# Patient Record
Sex: MALE | Race: WHITE | NOT HISPANIC OR LATINO | URBAN - METROPOLITAN AREA
[De-identification: names, ages, dates, MRNs, and addresses within clinical notes are randomized per-mention and may not be internally consistent; named-entity substitution may affect disease eponyms.]

---

## 2018-08-09 ENCOUNTER — ANESTHESIA EVENT (OUTPATIENT)
Dept: OPERATING ROOM | Facility: HOSPITAL | Age: 53
Setting detail: HOSPITAL OUTPATIENT SURGERY
End: 2018-08-09
Payer: COMMERCIAL

## 2018-08-16 ENCOUNTER — HOSPITAL ENCOUNTER (OUTPATIENT)
Facility: HOSPITAL | Age: 53
Setting detail: HOSPITAL OUTPATIENT SURGERY
Discharge: HOME | End: 2018-08-16
Attending: OTOLARYNGOLOGY | Admitting: OTOLARYNGOLOGY
Payer: COMMERCIAL

## 2018-08-16 ENCOUNTER — ANESTHESIA (OUTPATIENT)
Dept: OPERATING ROOM | Facility: HOSPITAL | Age: 53
Setting detail: HOSPITAL OUTPATIENT SURGERY
End: 2018-08-16
Payer: COMMERCIAL

## 2018-08-16 VITALS
BODY MASS INDEX: 26.95 KG/M2 | TEMPERATURE: 96.7 F | HEART RATE: 65 BPM | OXYGEN SATURATION: 94 % | WEIGHT: 210 LBS | HEIGHT: 74 IN | DIASTOLIC BLOOD PRESSURE: 85 MMHG | SYSTOLIC BLOOD PRESSURE: 130 MMHG | RESPIRATION RATE: 16 BRPM

## 2018-08-16 DIAGNOSIS — Z01.818 PREOP TESTING: Primary | ICD-10-CM

## 2018-08-16 DIAGNOSIS — Q18.0 BRANCHIAL CLEFT CYST: ICD-10-CM

## 2018-08-16 PROBLEM — R22.1 MASS OF RIGHT SIDE OF NECK: Chronic | Status: ACTIVE | Noted: 2018-08-16

## 2018-08-16 PROCEDURE — 63600000 HC DRUGS/DETAIL CODE: Performed by: ANESTHESIOLOGY

## 2018-08-16 PROCEDURE — 63700000 HC SELF-ADMINISTRABLE DRUG: Performed by: OTOLARYNGOLOGY

## 2018-08-16 PROCEDURE — 27200000 HC STERILE SUPPLY: Performed by: OTOLARYNGOLOGY

## 2018-08-16 PROCEDURE — 25800000 HC PHARMACY IV SOLUTIONS: Performed by: OTOLARYNGOLOGY

## 2018-08-16 PROCEDURE — 63600000 HC DRUGS/DETAIL CODE: Performed by: OTOLARYNGOLOGY

## 2018-08-16 PROCEDURE — 36000003 HC OR LEVEL 3 INITIAL 30MIN: Performed by: OTOLARYNGOLOGY

## 2018-08-16 PROCEDURE — 71000011 HC PACU PHASE 1 EA ADDL MIN: Performed by: OTOLARYNGOLOGY

## 2018-08-16 PROCEDURE — 36000013 HC OR LEVEL 3 EA ADDL MIN: Performed by: OTOLARYNGOLOGY

## 2018-08-16 PROCEDURE — 88307 TISSUE EXAM BY PATHOLOGIST: CPT | Performed by: OTOLARYNGOLOGY

## 2018-08-16 PROCEDURE — 71000002 HC PACU PHASE 2 INITIAL 30MIN: Performed by: OTOLARYNGOLOGY

## 2018-08-16 PROCEDURE — 25000000 HC PHARMACY GENERAL: Performed by: NURSE ANESTHETIST, CERTIFIED REGISTERED

## 2018-08-16 PROCEDURE — 63600000 HC DRUGS/DETAIL CODE: Performed by: NURSE ANESTHETIST, CERTIFIED REGISTERED

## 2018-08-16 PROCEDURE — 71000001 HC PACU PHASE 1 INITIAL 30MIN: Performed by: OTOLARYNGOLOGY

## 2018-08-16 PROCEDURE — 0WB60ZZ EXCISION OF NECK, OPEN APPROACH: ICD-10-PCS | Performed by: OTOLARYNGOLOGY

## 2018-08-16 PROCEDURE — 37000001 HC ANESTHESIA GENERAL: Performed by: OTOLARYNGOLOGY

## 2018-08-16 PROCEDURE — 25800000 HC PHARMACY IV SOLUTIONS: Performed by: NURSE ANESTHETIST, CERTIFIED REGISTERED

## 2018-08-16 PROCEDURE — 25000000 HC PHARMACY GENERAL: Performed by: OTOLARYNGOLOGY

## 2018-08-16 PROCEDURE — 71000012 HC PACU PHASE 2 EA ADDL MIN: Performed by: OTOLARYNGOLOGY

## 2018-08-16 RX ORDER — FENTANYL CITRATE 50 UG/ML
50 INJECTION, SOLUTION INTRAMUSCULAR; INTRAVENOUS
Status: DISCONTINUED | OUTPATIENT
Start: 2018-08-16 | End: 2018-08-16 | Stop reason: HOSPADM

## 2018-08-16 RX ORDER — CEFAZOLIN SODIUM/WATER 2 G/20 ML
2 SYRINGE (ML) INTRAVENOUS ONCE
Status: COMPLETED | OUTPATIENT
Start: 2018-08-16 | End: 2018-08-16

## 2018-08-16 RX ORDER — ACETAMINOPHEN 325 MG/1
650 TABLET ORAL EVERY 4 HOURS PRN
Status: DISCONTINUED | OUTPATIENT
Start: 2018-08-16 | End: 2018-08-16 | Stop reason: HOSPADM

## 2018-08-16 RX ORDER — ONDANSETRON HYDROCHLORIDE 2 MG/ML
INJECTION, SOLUTION INTRAVENOUS AS NEEDED
Status: DISCONTINUED | OUTPATIENT
Start: 2018-08-16 | End: 2018-08-16 | Stop reason: SURG

## 2018-08-16 RX ORDER — MORPHINE SULFATE 4 MG/ML
1-2 INJECTION, SOLUTION INTRAMUSCULAR; INTRAVENOUS
Status: DISCONTINUED | OUTPATIENT
Start: 2018-08-16 | End: 2018-08-16 | Stop reason: HOSPADM

## 2018-08-16 RX ORDER — PROPOFOL 10 MG/ML
INJECTION, EMULSION INTRAVENOUS AS NEEDED
Status: DISCONTINUED | OUTPATIENT
Start: 2018-08-16 | End: 2018-08-16 | Stop reason: SURG

## 2018-08-16 RX ORDER — BACITRACIN 500 UNIT/G
OINTMENT (GRAM) TOPICAL AS NEEDED
Status: DISCONTINUED | OUTPATIENT
Start: 2018-08-16 | End: 2018-08-16 | Stop reason: HOSPADM

## 2018-08-16 RX ORDER — DEXTROSE 40 %
15-30 GEL (GRAM) ORAL AS NEEDED
Status: DISCONTINUED | OUTPATIENT
Start: 2018-08-16 | End: 2018-08-16 | Stop reason: HOSPADM

## 2018-08-16 RX ORDER — LIDOCAINE HCL/EPINEPHRINE/PF 2%-1:200K
VIAL (ML) INJECTION AS NEEDED
Status: DISCONTINUED | OUTPATIENT
Start: 2018-08-16 | End: 2018-08-16 | Stop reason: HOSPADM

## 2018-08-16 RX ORDER — ONDANSETRON HYDROCHLORIDE 2 MG/ML
4 INJECTION, SOLUTION INTRAVENOUS
Status: DISCONTINUED | OUTPATIENT
Start: 2018-08-16 | End: 2018-08-16 | Stop reason: HOSPADM

## 2018-08-16 RX ORDER — DEXTROSE 50 % IN WATER (D50W) INTRAVENOUS SYRINGE
25 AS NEEDED
Status: DISCONTINUED | OUTPATIENT
Start: 2018-08-16 | End: 2018-08-16 | Stop reason: HOSPADM

## 2018-08-16 RX ORDER — SODIUM CHLORIDE 9 MG/ML
20 INJECTION, SOLUTION INTRAVENOUS CONTINUOUS
Status: DISCONTINUED | OUTPATIENT
Start: 2018-08-16 | End: 2018-08-16 | Stop reason: HOSPADM

## 2018-08-16 RX ORDER — DEXAMETHASONE SODIUM PHOSPHATE 4 MG/ML
INJECTION, SOLUTION INTRA-ARTICULAR; INTRALESIONAL; INTRAMUSCULAR; INTRAVENOUS; SOFT TISSUE AS NEEDED
Status: DISCONTINUED | OUTPATIENT
Start: 2018-08-16 | End: 2018-08-16 | Stop reason: SURG

## 2018-08-16 RX ORDER — GLYCOPYRROLATE 0.6MG/3ML
SYRINGE (ML) INTRAVENOUS AS NEEDED
Status: DISCONTINUED | OUTPATIENT
Start: 2018-08-16 | End: 2018-08-16 | Stop reason: SURG

## 2018-08-16 RX ORDER — ROCURONIUM BROMIDE 10 MG/ML
INJECTION, SOLUTION INTRAVENOUS AS NEEDED
Status: DISCONTINUED | OUTPATIENT
Start: 2018-08-16 | End: 2018-08-16 | Stop reason: SURG

## 2018-08-16 RX ORDER — HYDRALAZINE HYDROCHLORIDE 20 MG/ML
INJECTION INTRAMUSCULAR; INTRAVENOUS AS NEEDED
Status: DISCONTINUED | OUTPATIENT
Start: 2018-08-16 | End: 2018-08-16 | Stop reason: SURG

## 2018-08-16 RX ORDER — MIDAZOLAM HYDROCHLORIDE 2 MG/2ML
INJECTION, SOLUTION INTRAMUSCULAR; INTRAVENOUS AS NEEDED
Status: DISCONTINUED | OUTPATIENT
Start: 2018-08-16 | End: 2018-08-16 | Stop reason: SURG

## 2018-08-16 RX ORDER — HYDROMORPHONE HYDROCHLORIDE 1 MG/ML
0.5 INJECTION, SOLUTION INTRAMUSCULAR; INTRAVENOUS; SUBCUTANEOUS
Status: DISCONTINUED | OUTPATIENT
Start: 2018-08-16 | End: 2018-08-16 | Stop reason: HOSPADM

## 2018-08-16 RX ORDER — NEOSTIGMINE METHYLSULFATE 1 MG/ML
INJECTION INTRAVENOUS AS NEEDED
Status: DISCONTINUED | OUTPATIENT
Start: 2018-08-16 | End: 2018-08-16 | Stop reason: SURG

## 2018-08-16 RX ORDER — IBUPROFEN 200 MG
16-32 TABLET ORAL AS NEEDED
Status: DISCONTINUED | OUTPATIENT
Start: 2018-08-16 | End: 2018-08-16 | Stop reason: HOSPADM

## 2018-08-16 RX ORDER — FENTANYL CITRATE 50 UG/ML
INJECTION, SOLUTION INTRAMUSCULAR; INTRAVENOUS AS NEEDED
Status: DISCONTINUED | OUTPATIENT
Start: 2018-08-16 | End: 2018-08-16 | Stop reason: SURG

## 2018-08-16 RX ORDER — SODIUM CHLORIDE 9 MG/ML
INJECTION, SOLUTION INTRAVENOUS CONTINUOUS PRN
Status: DISCONTINUED | OUTPATIENT
Start: 2018-08-16 | End: 2018-08-16 | Stop reason: SURG

## 2018-08-16 RX ORDER — LIDOCAINE HYDROCHLORIDE 10 MG/ML
INJECTION, SOLUTION INFILTRATION; PERINEURAL AS NEEDED
Status: DISCONTINUED | OUTPATIENT
Start: 2018-08-16 | End: 2018-08-16 | Stop reason: SURG

## 2018-08-16 RX ADMIN — ROCURONIUM BROMIDE 30 MG: 10 INJECTION INTRAVENOUS at 08:20

## 2018-08-16 RX ADMIN — PROPOFOL 50 MG: 10 INJECTION, EMULSION INTRAVENOUS at 09:30

## 2018-08-16 RX ADMIN — HYDRALAZINE HYDROCHLORIDE 5 MG: 20 INJECTION INTRAMUSCULAR; INTRAVENOUS at 09:23

## 2018-08-16 RX ADMIN — DEXAMETHASONE SODIUM PHOSPHATE 10 MG: 4 INJECTION, SOLUTION INTRAMUSCULAR; INTRAVENOUS at 08:18

## 2018-08-16 RX ADMIN — FENTANYL CITRATE 25 MCG: 50 INJECTION INTRAMUSCULAR; INTRAVENOUS at 10:55

## 2018-08-16 RX ADMIN — LIDOCAINE HYDROCHLORIDE 5 ML: 10 INJECTION, SOLUTION INFILTRATION; PERINEURAL at 08:20

## 2018-08-16 RX ADMIN — Medication 2 G: at 08:12

## 2018-08-16 RX ADMIN — ONDANSETRON 4 MG: 2 INJECTION INTRAMUSCULAR; INTRAVENOUS at 10:15

## 2018-08-16 RX ADMIN — FENTANYL CITRATE 50 MCG: 50 INJECTION INTRAMUSCULAR; INTRAVENOUS at 09:01

## 2018-08-16 RX ADMIN — HYDROMORPHONE HYDROCHLORIDE 0.5 MG: 1 INJECTION, SOLUTION INTRAMUSCULAR; INTRAVENOUS; SUBCUTANEOUS at 12:15

## 2018-08-16 RX ADMIN — Medication 4 MG: at 10:34

## 2018-08-16 RX ADMIN — Medication 0.4 MG: at 10:34

## 2018-08-16 RX ADMIN — SODIUM CHLORIDE 20 ML/HR: 9 INJECTION, SOLUTION INTRAVENOUS at 07:09

## 2018-08-16 RX ADMIN — SODIUM CHLORIDE: 9 INJECTION, SOLUTION INTRAVENOUS at 08:09

## 2018-08-16 RX ADMIN — FENTANYL CITRATE 25 MCG: 50 INJECTION INTRAMUSCULAR; INTRAVENOUS at 09:30

## 2018-08-16 RX ADMIN — PROPOFOL 200 MG: 10 INJECTION, EMULSION INTRAVENOUS at 08:20

## 2018-08-16 RX ADMIN — MIDAZOLAM HYDROCHLORIDE 2 MG: 1 INJECTION, SOLUTION INTRAMUSCULAR; INTRAVENOUS at 08:18

## 2018-08-16 RX ADMIN — FENTANYL CITRATE 50 MCG: 50 INJECTION INTRAMUSCULAR; INTRAVENOUS at 10:50

## 2018-08-16 RX ADMIN — FENTANYL CITRATE 100 MCG: 50 INJECTION INTRAMUSCULAR; INTRAVENOUS at 08:18

## 2018-08-16 RX ADMIN — FENTANYL CITRATE 50 MCG: 50 INJECTION INTRAMUSCULAR; INTRAVENOUS at 09:17

## 2018-08-16 ASSESSMENT — PAIN - FUNCTIONAL ASSESSMENT
PAIN_FUNCTIONAL_ASSESSMENT: 0-10

## 2018-08-16 ASSESSMENT — PAIN DESCRIPTION - DESCRIPTORS: DESCRIPTORS: ACHING

## 2018-08-16 NOTE — ANESTHESIA POSTPROCEDURE EVALUATION
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:  (Branchial Cleft Cyst)    Surgeon:  Geoffrey Pan MD Responsible Provider:  Warren Bartlett DO    Anesthesia Type:  general ASA Status:  1          Anesthesia Type: general  PACU Vitals  8/16/2018 1056 - 8/16/2018 1115      8/16/2018 1100             BP: (!)  146/91    Temp: 36.9 °C (98.5 °F)    Pulse: 64    Resp: 15    SpO2: 96 %            Anesthesia Post Evaluation    Pain management: adequate  Patient location during evaluation: PACU  Patient participation: complete - patient participated  Level of consciousness: awake and alert  Cardiovascular status: acceptable  Airway Patency: adequate  Respiratory status: acceptable  Hydration status: acceptable  Anesthetic complications: no

## 2018-08-16 NOTE — ANESTHESIA PREPROCEDURE EVALUATION
Anesthesia ROS/MED HX      Neuro/Psych - neg  Cardiovascular- neg   ECG reviewed   Normal ECG  GI/Hepatic- neg  Renal Disease- neg  Endo/Other- neg      Past Surgical History:   Procedure Laterality Date   • WISDOM TOOTH EXTRACTION       Wt Readings from Last 3 Encounters:   08/16/18 95.3 kg (210 lb)   08/08/18 95.3 kg (210 lb)       Temp Readings from Last 3 Encounters:   08/16/18 36.7 °C (98.1 °F) (Temporal)       BP Readings from Last 3 Encounters:   08/16/18 140/88       Pulse Readings from Last 3 Encounters:   08/16/18 (!) 57       No results found for: WBC, HGB, HCT, MCV, PLT    No results found for: GLUCOSE, CALCIUM, NA, K, CO2, CL, BUN, CREATININE    No results found for: HCGPREGUR, PREGSERUM, HCG, HCGQUANT          Current Facility-Administered Medications   Medication Dose Route Frequency Provider Last Rate Last Dose   • ceFAZolin in sterile water (ANCEF) injection 2 g  2 g intravenous Once Geoffrey Pan MD       • sodium chloride 0.9 % infusion  20 mL/hr intravenous Continuous Geoffrey Pan MD 20 mL/hr at 08/16/18 0709 20 mL/hr at 08/16/18 0709       Prior to Admission medications    Not on File       There is no problem list on file for this patient.      Past Medical History:   Diagnosis Date   • Precancerous skin lesion     removed       Past Surgical History:   Procedure Laterality Date   • WISDOM TOOTH EXTRACTION             Physical Exam    Airway   Mallampati: II   TM distance: >3 FB   Neck ROM: full  Cardiovascular - normal   Rhythm: regular   Rate: normal  Pulmonary - normal   clear to auscultation  Dental - normal        Anesthesia Plan    Plan: general     Airway: direct visual laryngoscopy and oral intubation   ASA 1  Anesthetic plan and risks discussed with: patient  Induction:    intravenous

## 2018-08-16 NOTE — PERIOPERATIVE NURSING NOTE
Patient and wife, Albina, state they have received and are comfortable with discharge instructions from Dr. Pan. Patient states they were instructed to remove the dressing tomorrow and follow up with Dr. Pan in one week. Patient has voided, is tolerating PO intake, and states that his discomfort is tolerable at this time.

## 2018-08-16 NOTE — ANESTHESIA PROCEDURE NOTES
Airway  Urgency: elective    Start Time: 8/16/2018 8:21 AM    General Information and Staff    Patient location during procedure: OR  Anesthesiologist: LUCY DE LEON  Resident/CRNA: IRIS MEDINA    Indications and Patient Condition  Indications for airway management: anesthesia  Sedation level: general  Preoxygenated: yes  Patient position: sniffing  MILS maintained throughout  Mask difficulty assessment: 1 - vent by mask    Final Airway Details  Final airway type: endotracheal airway      Successful airway: ETT    Successful intubation technique: direct laryngoscopy  Blade: Lake  Blade size: #3  ETT size: 7.5 mm  Cormack-Lehane Classification: grade I - full view of glottis  Placement verified by: chest auscultation and capnometry   Measured from: lips  ETT to lips (cm): 22  Number of attempts at approach: 1  Atraumatic airway insertion

## 2018-08-16 NOTE — OP NOTE
"Preop Dx: Right Nerck mass     Postop Dx: Same as preop    Procedure:Excision right neck mass    Surgeon: Dr. Geoffrey Pan MD    Assistant:    Anesthesia: GETA    EBL: 200 cc    Specimen: None     Complications: none     Drains: 1/4 \" penrose    Operative Summary: This is an operative dictation on  Sly Martinez .  This 53-year-old male has a long-standing history of a large right cervical mass, since 2013.  It had become infected during the year of 2018 and was treated with antibiotic management.  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  Patient is being admitted for elective excision under general anesthesia.    Patient was brought to the operating room under general anesthesia via oral endotracheal tube intubation which was atraumatic.  Shoulder roll was placed and the neck was hyperextended.  Patient was prepped and draped in usual format.  The incision was marked on the right right neck extending from the angle of the mandible down along the anterior border of the sternocleidomastoid muscle measuring approximately 7 cm.  The mandible margin was marked as well.  Incision was then made several minutes after patient was infiltrated with 1% Xylocaine with 1/100,000 epinephrine.  This was carried through the skin, subcutaneous tissue, platysma, and investing fascia.  Subfascial dissection revealed a large mass that was adherent to the sternocleidomastoid muscle and extended just posterior to the submandibular gland.  The mass itself measured approximately 4-5 cm.  Blunt dissection was carried out to release the posterior edge of the tumor from the sternocleidomastoid muscle.  Needlepoint cautery was used to control oozing and several small vessels were divided and ligated with 2-0 and 3-0 silk ties.  The " dissection anteriorly revealed a vessel that was divided and ligated using 2-0 silk ties was felt to be the lingual vein.  There is another branch but there was also identified and divided and ligated.  Dissection was carried out inferiorly along the sternocleidomastoid muscle down to the level of hyoid bone.  The internal jugular vein was visualized deep to the dissection.  The inferior aspect of the dissection was then released.  The ansa hypoglossal was identified dissection was then carried out from inferior to superior.  Several vessels were divided and ligated superiorly.  The digastric tendon was identified as was the hypoglossal nerve.  These were kept intact away from the dissection.  Careful dissection did not reveal any functioning duct as will be the case ,in a brachial cleft cyst, but due to his previous infections it is felt that it could have been atrophic.  The mass was then removed and the needlepoint cautery used to control oozing.  Wound was irrigated.  Total blood loss of approximately 200 cc was noted.  Procedure was tolerated nicely.  There were no complications during the procedure.    The wound was then closed using 4 chromic sutures at the level of platysma and also at the subcutaneous level.  A 1/4 inch Penrose drain was placed and was not secured to the wound.  Running locked  5-0 nylon stitch was placed in the skin level and then dressings were applied.  The drain itself was actually sewn to the dressings so that it will be removed with the dressing change.  A universal fascial band was applied externally for pressure.  Patient was extubated in good order with good airway anbd good voice.  This been operative dictation on Sly Martinez, dictated by Maxim on 8/16/2018. Send copy of this note to be sent to Dr. Serrano.  Geoffrey Pan MD

## 2024-02-01 LAB
CASE RPRT: NORMAL
CLINICAL INFO: NORMAL
PATH REPORT ADDENDUM.SYNOPTIC DOC: NORMAL
PATH REPORT.ADDENDUM SPEC: NORMAL
PATH REPORT.ADDENDUM SPEC: NORMAL
PATH REPORT.FINAL DX SPEC: NORMAL
PATH REPORT.FINAL DX SPEC: NORMAL
PATH REPORT.GROSS SPEC: NORMAL

## 2024-04-24 ENCOUNTER — EVALUATION (OUTPATIENT)
Dept: PHYSICAL THERAPY | Facility: CLINIC | Age: 59
End: 2024-04-24
Payer: COMMERCIAL

## 2024-04-24 DIAGNOSIS — I89.0 LYMPHEDEMA: Primary | ICD-10-CM

## 2024-04-24 PROCEDURE — 97162 PT EVAL MOD COMPLEX 30 MIN: CPT | Performed by: PHYSICAL THERAPIST

## 2024-04-24 PROCEDURE — 97140 MANUAL THERAPY 1/> REGIONS: CPT | Performed by: PHYSICAL THERAPIST

## 2024-04-24 RX ORDER — LANOLIN ALCOHOL/MO/W.PET/CERES
3 CREAM (GRAM) TOPICAL
COMMUNITY

## 2024-04-24 NOTE — LETTER
2024    ESPERANZA Flores  [address] [id number]    Patient: Burak Smith   YOB: 1965   Date of Visit: 2024     Encounter Diagnosis     ICD-10-CM    1. Lymphedema  I89.0           Dear Dr. Kingsley:    Thank you for your recent referral of Burak Smith. Please review the attached evaluation summary from Burak's recent visit.     Please verify that you agree with the plan of care by signing the attached order.     If you have any questions or concerns, please do not hesitate to call.     I sincerely appreciate the opportunity to share in the care of one of your patients and hope to have another opportunity to work with you in the near future.       Sincerely,    Kandy Bedoya, PT      Referring Provider:      I certify that I have read the below Plan of Care and certify the need for these services furnished under this plan of treatment while under my care.                    ESPERANZA Flores  [address] [address]  Via Fax: [phone]          PT Evaluation     Today's date: 2024  Patient name: Burak Smith  : 1965  MRN: [id number]  Referring provider: Dahiana Kingsley CRNP  Dx:   Encounter Diagnosis     ICD-10-CM    1. Lymphedema  I89.0                      Assessment  Assessment details: Pt is a 58 y.o. year old male coming to outpatient PT with facial lymphedema onset 10/23. Pt presents with increased edema decreased cervical ROM, good UE ROM /strength, and overall decreased functional mobility. Pt would benefit from skilled PT services in order to address these deficits and reach maximum level of function. Thank you kindly for the referral!    Pt has a high insurance co pay of $80. Pt requested that therapist instruct his friend on how to perform facial MLD. Pt was instructed to purchase a facial compression garment. K tape to scar was perform and instruction  "given. Pt and friend were instructed in lymphedema management and MLD technique.  Impairments: abnormal or restricted ROM, activity intolerance and lacks appropriate home exercise program  Other impairment: increased facial edema  Barriers to therapy: High co pay  Understanding of Dx/Px/POC: good   Prognosis: good    Goals  Consul only. No goals set.    Plan  Patient would benefit from: PT eval and skilled physical therapy  Planned therapy interventions: home exercise program  Other planned therapy interventions: MLD/ CLT  Frequency: follow up -prn.  Duration in weeks: 4  Plan of Care beginning date: 4/24/2024  Plan of Care expiration date: 5/22/2024  Treatment plan discussed with: PTA and patient        Subjective Evaluation    History of Present Illness  Mechanism of injury: Pt has a history of recurrent tonsillar squamous cell carcinoma diagnosed in 9/2023. Pt reports increased chin swelling since TORS resection 10/12/2023. 40 lymph nodes were removed, with 4 positive for squamous cell carcinoma.  Pt underwent chemotherapy and radiation therapy tx January 1/2024. Feeding tube and chemo port have been removed. Pt was later diagnosed with lymphedema. Pt feels increased tighntess  under his chin and has decreased ROM in his neck and has difficulty looking down and turning his head to drive. Pt is making improvements with swallowing. Pt has constant dry/ \"cotton mouth\"     Work: sales  Hobbies: blue grass shows  Gait: no abnormalities  Patient Goals  Patient goals for therapy: decreased edema    Pain  No pain reported  Location: neck; R ear- burning  Quality: burning and tight    Social Support    Employment status: working  Hand dominance: right          Objective     Neurological Testing     Sensation     Shoulder   Left Shoulder   Intact: light touch    Right Shoulder   Intact: Light touch    Reflexes   Left   Biceps (C5/C6): trace (1+)  Brachioradialis (C6): trace (1+)  Triceps (C7): trace (1+)    Right   Biceps " (C5/C6): trace (1+)  Brachioradialis (C6): trace (1+)  Triceps (C7): trace (1+)    Active Range of Motion   Cervical/Thoracic Spine       Cervical    Flexion: 35 degrees   Extension: 40 degrees      Left lateral flexion: 24 degrees     with pain  Right lateral flexion: 23 degrees     with pain  Left rotation: 55 degrees  Right rotation: 55 degrees     Left Shoulder   Normal active range of motion    Right Shoulder   Normal active range of motion    Additional Active Range of Motion Details  Posture: pt sits with mild rounded shoulders/ forward head  Mild scar tissue over R anterior neck incision  (+) R cheek facial edema and submandibular edema;   (-) fibrosis  (-) stemmer sign    Strength/Myotome Testing     Left Shoulder   Normal muscle strength    Right Shoulder   Normal muscle strength         Dx: facial lymphedema  EPOC:   CO-MORBIDITIES:   PERSONAL FACTORS:   Precautions: none      Manuals 4/24            MLD/ CLT             Instruction in self/ friend MLD 8'                                      Neuro Re-Ed                                                                                                                     Ther Ex             Lymphedema dx & mgt educ 5'                                                                                                                                 Ther Activity                                       Gait Training                                       Modalities

## 2024-04-24 NOTE — PROGRESS NOTES
PT Evaluation     Today's date: 2024  Patient name: Burak Smith  : 1965  MRN: [id number]  Referring provider: Dahiana Kingsley CRNP  Dx:   Encounter Diagnosis     ICD-10-CM    1. Lymphedema  I89.0                      Assessment  Assessment details: Pt is a 58 y.o. year old male coming to outpatient PT with facial lymphedema onset 10/23. Pt presents with increased edema decreased cervical ROM, good UE ROM /strength, and overall decreased functional mobility. Pt would benefit from skilled PT services in order to address these deficits and reach maximum level of function. Thank you kindly for the referral!    Pt has a high insurance co pay of $80. Pt requested that therapist instruct his friend on how to perform facial MLD. Pt was instructed to purchase a facial compression garment. K tape to scar was perform and instruction given. Pt and friend were instructed in lymphedema management and MLD technique.  Impairments: abnormal or restricted ROM, activity intolerance and lacks appropriate home exercise program  Other impairment: increased facial edema  Barriers to therapy: High co pay  Understanding of Dx/Px/POC: good   Prognosis: good    Goals  Consul only. No goals set.    Plan  Patient would benefit from: PT eval and skilled physical therapy  Planned therapy interventions: home exercise program  Other planned therapy interventions: MLD/ CLT  Frequency: follow up -prn.  Duration in weeks: 4  Plan of Care beginning date: 2024  Plan of Care expiration date: 2024  Treatment plan discussed with: PTA and patient        Subjective Evaluation    History of Present Illness  Mechanism of injury: Pt has a history of recurrent tonsillar squamous cell carcinoma diagnosed in 2023. Pt reports increased chin swelling since TORS resection 10/12/2023. 40 lymph nodes were removed, with 4 positive for squamous cell carcinoma.  Pt underwent chemotherapy and radiation therapy tx 2024. Feeding tube  "and chemo port have been removed. Pt was later diagnosed with lymphedema. Pt feels increased tighntess  under his chin and has decreased ROM in his neck and has difficulty looking down and turning his head to drive. Pt is making improvements with swallowing. Pt has constant dry/ \"cotton mouth\"     Work: sales  Hobbies: blue grass shows  Gait: no abnormalities  Patient Goals  Patient goals for therapy: decreased edema    Pain  No pain reported  Location: neck; R ear- burning  Quality: burning and tight    Social Support    Employment status: working  Hand dominance: right          Objective     Neurological Testing     Sensation     Shoulder   Left Shoulder   Intact: light touch    Right Shoulder   Intact: Light touch    Reflexes   Left   Biceps (C5/C6): trace (1+)  Brachioradialis (C6): trace (1+)  Triceps (C7): trace (1+)    Right   Biceps (C5/C6): trace (1+)  Brachioradialis (C6): trace (1+)  Triceps (C7): trace (1+)    Active Range of Motion   Cervical/Thoracic Spine       Cervical    Flexion: 35 degrees   Extension: 40 degrees      Left lateral flexion: 24 degrees     with pain  Right lateral flexion: 23 degrees     with pain  Left rotation: 55 degrees  Right rotation: 55 degrees     Left Shoulder   Normal active range of motion    Right Shoulder   Normal active range of motion    Additional Active Range of Motion Details  Posture: pt sits with mild rounded shoulders/ forward head  Mild scar tissue over R anterior neck incision  (+) R cheek facial edema and submandibular edema;   (-) fibrosis  (-) stemmer sign    Strength/Myotome Testing     Left Shoulder   Normal muscle strength    Right Shoulder   Normal muscle strength         Dx: facial lymphedema  EPOC:   CO-MORBIDITIES:   PERSONAL FACTORS:   Precautions: none      Manuals 4/24            MLD/ CLT             Instruction in self/ friend MLD 8'                                      Neuro Re-Ed                                                                    "                                                  Ther Ex             Lymphedema dx & mgt educ 5'                                                                                                                                 Ther Activity                                       Gait Training                                       Modalities

## (undated) DEVICE — RETRACTOR WEITLANER

## (undated) DEVICE — CLAMP RIGHT ANGLE

## (undated) DEVICE — ***USE 56998*** SUTURE CHROMIC GUT 4-0 G121H

## (undated) DEVICE — PAD GROUND ELECTROSURGICAL W/CORD

## (undated) DEVICE — GOWN SURGICAL REINFORCED X-LAR

## (undated) DEVICE — TIP SUCTION FRAZIER 10FR

## (undated) DEVICE — DRESSING TEGADERM 2 3/8 X 2 3/4

## (undated) DEVICE — STIMULATOR NERVE

## (undated) DEVICE — LOOPS VESSEL MAXI WHITE DISPOSABLE

## (undated) DEVICE — SYRINGE DISP LUER-LOK 10 CC

## (undated) DEVICE — SOLN IRRIG STER WATER 1000ML

## (undated) DEVICE — ***USE 138514*** SUTURE SILK 3-0 A184H 18IN TIES

## (undated) DEVICE — MANIFOLD SINGLE PORT NEPTUNE

## (undated) DEVICE — ***USE 138515*** SUTURE SILK 2-0 A185H 18IN TIES

## (undated) DEVICE — CLIP LIGATING SM 6PK HORIZON RED

## (undated) DEVICE — SYRINGE BULB IRRIGATION

## (undated) DEVICE — SUTURE ETHILON 5-0   1666H

## (undated) DEVICE — GLOVE SZ 8 PROTEXIS PI

## (undated) DEVICE — Device

## (undated) DEVICE — TIP BOVIE NEEDLE COATED

## (undated) DEVICE — ***USE 138483*** SUTURE CHROMIC GUT 4-0   635H

## (undated) DEVICE — DRESSING TELFA 3X4

## (undated) DEVICE — SOLN IRRIG .9%SOD 500ML

## (undated) DEVICE — PACK FACIAL PMH

## (undated) DEVICE — SPONGE RAYTEC 8 X 4 16-PLY

## (undated) DEVICE — SUTURE CHROMIC GUT 3-0 L112G REEL

## (undated) DEVICE — DRESSING SPONGE GAUZE 4X4 STER

## (undated) DEVICE — ***USE 57698*** SLEEVE FLOWTRON DVT CALF SINGLE USE

## (undated) DEVICE — SPONGES 4INX4IN STERILE

## (undated) DEVICE — BLANKET LOWER BODY

## (undated) DEVICE — TAPE MICROPORE 1IN